# Patient Record
Sex: MALE | URBAN - METROPOLITAN AREA
[De-identification: names, ages, dates, MRNs, and addresses within clinical notes are randomized per-mention and may not be internally consistent; named-entity substitution may affect disease eponyms.]

---

## 2024-09-27 ENCOUNTER — APPOINTMENT (OUTPATIENT)
Age: 64
Setting detail: DERMATOLOGY
End: 2024-09-27

## 2024-09-27 DIAGNOSIS — L71.8 OTHER ROSACEA: ICD-10-CM

## 2024-09-27 DIAGNOSIS — H00.01 HORDEOLUM EXTERNUM: ICD-10-CM

## 2024-09-27 PROBLEM — H00.011 HORDEOLUM EXTERNUM RIGHT UPPER EYELID: Status: ACTIVE | Noted: 2024-09-27

## 2024-09-27 PROCEDURE — OTHER MIPS QUALITY: OTHER

## 2024-09-27 PROCEDURE — OTHER PRESCRIPTION: OTHER

## 2024-09-27 PROCEDURE — OTHER COUNSELING: OTHER

## 2024-09-27 PROCEDURE — OTHER PRESCRIPTION MEDICATION MANAGEMENT: OTHER

## 2024-09-27 PROCEDURE — 99203 OFFICE O/P NEW LOW 30 MIN: CPT

## 2024-09-27 RX ORDER — METRONIDAZOLE 7.5 MG/G
CREAM TOPICAL
Qty: 45 | Refills: 2 | Status: ERX | COMMUNITY
Start: 2024-09-27

## 2024-09-27 RX ORDER — DOXYCYCLINE 50 MG/1
TABLET, FILM COATED ORAL
Qty: 30 | Refills: 5 | Status: ERX | COMMUNITY
Start: 2024-09-27

## 2024-09-27 ASSESSMENT — LOCATION SIMPLE DESCRIPTION DERM
LOCATION SIMPLE: RIGHT SUPERIOR EYELID
LOCATION SIMPLE: RIGHT CHEEK
LOCATION SIMPLE: LEFT CHEEK
LOCATION SIMPLE: NOSE

## 2024-09-27 ASSESSMENT — LOCATION DETAILED DESCRIPTION DERM
LOCATION DETAILED: LEFT SUPERIOR CENTRAL MALAR CHEEK
LOCATION DETAILED: NASAL DORSUM
LOCATION DETAILED: RIGHT LATERAL SUPERIOR EYELID
LOCATION DETAILED: RIGHT MEDIAL MALAR CHEEK

## 2024-09-27 ASSESSMENT — LOCATION ZONE DERM
LOCATION ZONE: NOSE
LOCATION ZONE: FACE
LOCATION ZONE: EYELID

## 2025-03-10 ENCOUNTER — APPOINTMENT (OUTPATIENT)
Age: 65
Setting detail: DERMATOLOGY
End: 2025-03-10

## 2025-03-10 DIAGNOSIS — L71.8 OTHER ROSACEA: ICD-10-CM

## 2025-03-10 PROCEDURE — OTHER PRESCRIPTION: OTHER

## 2025-03-10 PROCEDURE — OTHER MIPS QUALITY: OTHER

## 2025-03-10 PROCEDURE — OTHER PRESCRIPTION MEDICATION MANAGEMENT: OTHER

## 2025-03-10 PROCEDURE — 99213 OFFICE O/P EST LOW 20 MIN: CPT

## 2025-03-10 PROCEDURE — OTHER COUNSELING: OTHER

## 2025-03-10 RX ORDER — DOXYCYCLINE 50 MG/1
TABLET, FILM COATED ORAL
Qty: 30 | Refills: 5 | Status: ERX

## 2025-03-10 ASSESSMENT — LOCATION SIMPLE DESCRIPTION DERM
LOCATION SIMPLE: NOSE
LOCATION SIMPLE: RIGHT CHEEK
LOCATION SIMPLE: LEFT CHEEK

## 2025-03-10 ASSESSMENT — LOCATION DETAILED DESCRIPTION DERM
LOCATION DETAILED: RIGHT MEDIAL MALAR CHEEK
LOCATION DETAILED: NASAL DORSUM
LOCATION DETAILED: LEFT SUPERIOR CENTRAL MALAR CHEEK

## 2025-03-10 ASSESSMENT — LOCATION ZONE DERM
LOCATION ZONE: FACE
LOCATION ZONE: NOSE

## 2025-03-10 NOTE — PROCEDURE: PRESCRIPTION MEDICATION MANAGEMENT
Continue Regimen: metronidazole 0.75 % topical cream \\nQuantity: 45.0 g  Days Supply: 30\\nSig: Apply twice daily x 4 weeks then as needed.
Detail Level: Zone
Render In Strict Bullet Format?: No